# Patient Record
Sex: FEMALE | Race: WHITE
[De-identification: names, ages, dates, MRNs, and addresses within clinical notes are randomized per-mention and may not be internally consistent; named-entity substitution may affect disease eponyms.]

---

## 2017-02-28 ENCOUNTER — HOSPITAL ENCOUNTER (EMERGENCY)
Dept: HOSPITAL 80 - CED | Age: 32
Discharge: HOME | End: 2017-02-28
Payer: SELF-PAY

## 2017-02-28 VITALS
DIASTOLIC BLOOD PRESSURE: 64 MMHG | OXYGEN SATURATION: 94 % | HEART RATE: 63 BPM | TEMPERATURE: 98.4 F | SYSTOLIC BLOOD PRESSURE: 99 MMHG

## 2017-02-28 VITALS — RESPIRATION RATE: 20 BRPM

## 2017-02-28 DIAGNOSIS — J40: Primary | ICD-10-CM

## 2017-02-28 NOTE — UCPHY
H & P


Patient Type: New


Chief Complaint Nursing Narrative: cough productive getting worse


HPI/ROS: 





HPI





CHIEF COMPLAINT:  Cough





HISTORY OF PRESENT ILLNESS:  This patient very pleasant 31-year-old female, 

denies having any significant medical history does not take any daily 

medications, she presents to the urgent care with 3 days of worsening 

bronchitic sounding cough.  Faint wheezing.  Patient tells me that she had a 

cough intermittently for 6 months however over the past 3 days progressively 

worse.  It is worse when she exhales and gets spasm.  She denies fever, denies 

hemoptysis, denies productive cough, denies significant shortness of breath or 

chest pain or pleuritic pain.





Past Medical History:  Use take levothyroxine for thyroid disease but does not 

do so anymore





Past Surgical History: denies any significant surgical history





Social History: denies daily use tobacco drugs or alcohol





Family History:  Noncontributory








ROS   


REVIEW OF SYSTEMS:


A comprehensive 10 point review of systems is otherwise negative aside from 

elements mentioned in the history of present illness.








Exam   


Constitutional   triage nursing summary reviewed, vital signs reviewed, awake/

alert. 


Eyes   normal conjunctivae and sclera, EOMI, PERRLA. 


HENT   normal inspection, atraumatic, moist mucus membranes, no epistaxis, neck 

supple/ no meningismus, no raccoon eyes. 


Respiratory  bronchitic sounding cough especially during expiratory phase, no 

wheezing, clear to auscultation bilaterally, normal breath sounds, no 

respiratory distress, no wheezing. 


Cardiovascular   rate normal, regular rhythm, no murmur, no edema, distal 

pulses normal. 


Gastrointestinal   soft, non-tender, no rebound, no guarding, normal bowel 

sounds, no distension, no pulsatile mass. 


Genitourinary   no CVA tenderness. 


Musculoskeletal  no midline vertebral tenderness, full range of motion, no calf 

swelling, no tenderness of extremities, no meningismus, good pulses, 

neurovascularly intact.


Skin   pink, warm, & dry, no rash, skin atraumatic. 


Neurologic   awake, alert and oriented x 3, AAOx3, moves all 4 extremities 

equally, motor intact, sensory intact, CN II-XII intact, normal cerebellar, 

normal vision, normal speech. 


Psychiatric   normal mood/affect. 


Heme/Lymph/Immune   no lymphadenopathy.





Differential Diagnosis:  Includes but is not limited to in a particular order, 

upper respiratory tract infection, bronchitis, viral pneumonia, bacterial 

pneumonia, pneumothorax, pulmonary embolism, pertussis





Medical Decision Making: patient had a x-ray chest two view to evaluate for 

pneumonia pneumothorax, patient be given a DuoNeb breathing treatment here in 

the emergency room.  I feel that this patient benefit from albuterol inhaler, 

prednisone, guaifenesin and azithromycin.





Re-evaluation:








ED x-ray chest two view: negative for acute cardiopulmonary disease 

specifically no appreciable focal acute infiltrate.  Image interpreted by 

myself.


Source: Patient





- Personal History


LMP (Females 10-55): 22-28 Days Ago


Current Tetanus/Diphtheria Vaccine: Unsure





- Medical/Surgical History


Hx Asthma: No


Hx Chronic Respiratory Disease: No


Hx Diabetes: No


Hx Cardiac Disease: No


Hx Renal Disease: No


Hx Cirrhosis: No


Hx Alcoholism: No


Hx HIV/AIDS: No


Hx Splenectomy or Spleen Trauma: No


Other PMH: Normally runs low BP





- Family History


Significant Family History: No pertinent family hx





- Social History


Smoking Status: Never smoked


Constitutional: 


 Initial Vital Signs











Temperature (C)  36.7 C   02/28/17 19:45


 


Heart Rate  54 L  02/28/17 19:45


 


Respiratory Rate  20   02/28/17 19:45


 


Blood Pressure  114/63   02/28/17 19:45


 


O2 Sat (%)  99   02/28/17 19:45








 











O2 Delivery Mode               Room Air














Allergies/Adverse Reactions: 


 





No Known Allergies Allergy (Verified 02/28/17 19:44)


 








Home Medications: 














 Medication  Instructions  Recorded


 


AZITHROMYCIN [Z-PACK] 250 mg PO DAILY #6 tab 02/28/17


 


Albuterol [Proventil Inhaler HFA 1 - 2 puffs IH Q4H #1 mdi 02/28/17





(*)]  


 


Guaifenesin [Guaifenesin ER] 600 mg PO BID #14 tab.er.12h 02/28/17


 


predniSONE 60 mg PO DAILY #15 tab 02/28/17














Medical Decision Making





- Data Points


Medications Given: 


 








Discontinued Medications





Albuterol/Ipratropium (Duoneb)  3 ml IH EDNOW ONE


   Stop: 02/28/17 20:00


   Last Admin: 02/28/17 20:05 Dose:  3 ml








Departure





- Departure


Disposition: Home, Routine, Self-Care


Clinical Impression: 


 Bronchitis





Condition: Good


Instructions:  Acute Bronchitis (ED)


Additional Instructions: 


1. Drink lots of fluids stay well-hydrated


2. take her prescriptions as prescribed.


3.Return to the urgent care or emergency room if develops any worsening 

symptoms questions or concerns.


Referrals: 


NONE *PRIMARY CARE P,. [Primary Care Provider] - As per Instructions


Prescriptions: 


Albuterol [Proventil Inhaler HFA (*)] 1 - 2 puffs IH Q4H #1 mdi


AZITHROMYCIN [Z-PACK] 250 mg PO DAILY #6 tab


Guaifenesin [Guaifenesin ER] 600 mg PO BID #14 tab.er.12h


predniSONE 60 mg PO DAILY #15 tab





- PQRS


PQRS Measurement: 





n/a

## 2018-05-19 ENCOUNTER — HOSPITAL ENCOUNTER (EMERGENCY)
Dept: HOSPITAL 80 - FED | Age: 33
Discharge: HOME | End: 2018-05-19
Payer: SELF-PAY

## 2018-05-19 VITALS — DIASTOLIC BLOOD PRESSURE: 68 MMHG | SYSTOLIC BLOOD PRESSURE: 97 MMHG

## 2018-05-19 DIAGNOSIS — R10.31: Primary | ICD-10-CM

## 2018-05-19 LAB — PLATELET # BLD: 236 10^3/UL (ref 150–400)

## 2018-05-19 NOTE — EDPHY
H & P


Stated Complaint: RLQ Pain


Time Seen by Provider: 05/19/18 11:10


HPI/ROS: 





CHIEF COMPLAINT:  Right lower quadrant abdominal pain





HISTORY OF PRESENT ILLNESS:  32-year-old healthy female with no history of 

abdominal surgeries awoke this morning with sharp stabbing right lower quadrant 

abdominal pain.  No nausea no vomiting.  No back or flank pain.  No urinary 

abnormality.  Normal bowel movement.  No trauma.





She was scheduled to give post race massage is a triathlon, was at the massage 

mcdaniel however the pain became progressively worse and she therefore drove to 

the ER.  At the time I interview and examine her she is asymptomatic.





PRIMARY CARE PROVIDER:  





REVIEW OF SYSTEMS:


A ten point review of systems was performed and is negative with the exception 

of the items mentioned in the HPI








PAST MEDICAL & SURGICAL  HISTORY:  No history of abdominal surgery





SOCIAL HISTORY: Nonsmoker   














************


PHYSICAL EXAM





(Prior to examination, patient consented to physical exam, hands were washed 

and my usual and customary physical exam procedures followed)


1) GENERAL: Well-developed, well-nourished, alert and oriented.  Appears to be 

in no acute distress.  Smiling.


2) HEAD: Normocephalic, atraumatic


3) HEENT: Pupils equal, round, reactive to light bilaterally.  Sclera 

anicteric.  Nasopharynx, oropharynx, clear, no lesions. 


4) NECK: Full range of motion, no meningeal signs.


5) LUNGS: Clear auscultation bilaterally, no wheezes, no rhonchi, no 

retractions.   


6) HEART: Regular rate and rhythm, no murmur, no heave, no gallop.


7) ABDOMEN: No guarding, negative McBurney's point pain, negative Lockwood's, 

negative Rovsing's, negative peritoneal sign, I am unable to elicit any 

abdominal pain on exam,


8) MUSCULOSKELETAL: Moving all extremities, no focal areas of tenderness, no 

obvious trauma.  No peripheral edema or discoloration.


9) BACK: No CVA tenderness, no midline vertebral tenderness, no fluctuance, no 

step-off, no obvious trauma, no visual or palpable abnormality. 


10) SKIN: No rash, no petechiae. 


11) Psychiatric:  Patient is oriented X 3, there is no agitation.








***************





DIFFERENTIAL DIAGNOSIS:   My differential diagnosis includes, but is not 

limited to, acute appendicitis, acute cholecystitis, bowel obstruction, acute 

pancreatitis, ovarian torsion, ectopic pregnancy, gastritis and urinary tract 

infection.  The patient understands that this diagnosis is provisional and can 

never be 100% accurate.  This is a partial list of diagnoses considered. These 

considerations are based on history, physical exam, past history and 

reassessment.








- Personal History


LMP (Females 10-55): 8-14 Days Ago


Current Tetanus/Diphtheria Vaccine: No


Current Tetanus Diphtheria and Acellular Pertussis (TDAP): No





- Medical/Surgical History


Hx Asthma: No


Hx Chronic Respiratory Disease: No


Hx Diabetes: No


Hx Cardiac Disease: No


Hx Renal Disease: No


Hx Cirrhosis: No


Hx Alcoholism: No


Hx HIV/AIDS: No


Hx Splenectomy or Spleen Trauma: No


Other PMH: Normally runs low BP





- Social History


Smoking Status: Never smoked


Constitutional: 


 Initial Vital Signs











Temperature (C)  36.7 C   05/19/18 11:03


 


Heart Rate  47 L  05/19/18 11:03


 


Respiratory Rate  18   05/19/18 11:03


 


Blood Pressure  99/58 L  05/19/18 11:03


 


O2 Sat (%)  98   05/19/18 11:03








 











O2 Delivery Mode               Room Air














Allergies/Adverse Reactions: 


 





No Known Allergies Allergy (Verified 02/28/17 19:44)


 








Home Medications: 














 Medication  Instructions  Recorded


 


AZITHROMYCIN [Z-PACK] 250 mg PO DAILY #6 tab 02/28/17


 


Albuterol [Proventil Inhaler HFA 1 - 2 puffs IH Q4H #1 mdi 02/28/17





(*)]  


 


guaiFENesin [Guaifenesin ER] 600 mg PO BID #14 tab.er.12h 02/28/17


 


predniSONE 60 mg PO DAILY #15 tab 02/28/17














Medical Decision Making





- Diagnostics


Imaging Results: 


 Imaging Impressions





Abdomen Ultrasound  05/19/18 11:23


Impression:


1. Nonvisualization of the appendix.


 


Results called to Chip Fox PA-C, at 1:30 PM.








Pelvic/Renal Ultrasound  05/19/18 11:23


Impression: Negative pelvic sonogram. Small follicle cysts within the ovaries, 

with minimal free fluid left adnexa. 


 


Results called to Chip Fox PA-C, at 1:30 PM.











ED Course/Re-evaluation: 





11:32 a.m.:  Care of patient under supervision of  secondary supervising 

physician Dr Reis .  Will obtain diagnostic studies.





1:45 p.m.:  Re-evaluation.  At this time she is sitting upright, eating an 

energy bar, smiling, states that she is has no pain.  Re-examined her abdomen 

at this time which is soft no guarding or rebound no McBurney's point pain.  

Discussed her imaging results showing bilateral follicular cyst.  Appendix not 

visualized.  We discussed options.  At this time I think that acute 

appendicitis is less than likely.  I have discussed options including CT 

imaging now or recheck in 12 hr.  She prefers recheck in 12 hr which I think is 

a reasonable decision.  I believe her to have decision-making capacity.  

Definitely in the meantime she develops new or worsening symptoms to return to 

the ER sooner.  She feels comfortable being discharged.





- Data Points


Laboratory Results: 


 Laboratory Results





 05/19/18 11:15 





 05/19/18 11:15 





 











  05/19/18 05/19/18 05/19/18





  11:45 11:15 11:15


 


WBC      





    


 


RBC      





    


 


Hgb      





    


 


Hct      





    


 


MCV      





    


 


MCH      





    


 


MCHC      





    


 


RDW      





    


 


Plt Count      





    


 


MPV      





    


 


Neut % (Auto)      





    


 


Lymph % (Auto)      





    


 


Mono % (Auto)      





    


 


Eos % (Auto)      





    


 


Baso % (Auto)      





    


 


Nucleat RBC Rel Count      





    


 


Absolute Neuts (auto)      





    


 


Absolute Lymphs (auto)      





    


 


Absolute Monos (auto)      





    


 


Absolute Eos (auto)      





    


 


Absolute Basos (auto)      





    


 


Absolute Nucleated RBC      





    


 


Immature Gran %      





    


 


Immature Gran #      





    


 


Sodium      143 mEq/L mEq/L





     (135-145) 


 


Potassium      3.9 mEq/L mEq/L





     (3.3-5.0) 


 


Chloride      104 mEq/L mEq/L





     () 


 


Carbon Dioxide      26 mEq/l mEq/l





     (22-31) 


 


Anion Gap      13 mEq/L mEq/L





     (8-16) 


 


BUN      15 mg/dL mg/dL





     (7-23) 


 


Creatinine      0.8 mg/dL mg/dL





     (0.6-1.0) 


 


Estimated GFR      > 60 





    


 


Glucose      85 mg/dL mg/dL





     () 


 


Calcium      9.2 mg/dL mg/dL





     (8.5-10.4) 


 


Total Bilirubin      1.2 mg/dL mg/dL





     (0.1-1.4) 


 


Conjugated Bilirubin      0.4 mg/dL mg/dL





     (0.0-0.5) 


 


Unconjugated Bilirubin      0.8 mg/dL mg/dL





     (0.0-1.1) 


 


AST      47 IU/L H IU/L





     (14-46) 


 


ALT      16 IU/L IU/L





     (9-52) 


 


Alkaline Phosphatase      56 IU/L IU/L





     () 


 


Total Protein      7.3 g/dL g/dL





     (6.3-8.2) 


 


Albumin      4.4 g/dL g/dL





     (3.5-5.0) 


 


Lipase      53 IU/L IU/L





     () 


 


Beta HCG, Qual    NEGATIVE   





    


 


Urine Color  YELLOW     





    


 


Urine Appearance  CLEAR     





    


 


Urine pH  5.0     





   (5.0-7.5)   


 


Ur Specific Gravity  1.011     





   (1.002-1.030)   


 


Urine Protein  NEGATIVE     





   (NEGATIVE)   


 


Urine Ketones  NEGATIVE     





   (NEGATIVE)   


 


Urine Blood  NEGATIVE     





   (NEGATIVE)   


 


Urine Nitrate  NEGATIVE     





   (NEGATIVE)   


 


Urine Bilirubin  NEGATIVE     





   (NEGATIVE)   


 


Urine Urobilinogen  NEGATIVE EU EU    





   (0.2-1.0)   


 


Ur Leukocyte Esterase  NEGATIVE     





   (NEGATIVE)   


 


Urine RBC  NONE SEEN /hpf /hpf    





   (0-3)   


 


Urine WBC  1-3 /hpf /hpf    





   (0-3)   


 


Ur Epithelial Cells  TRACE /lpf /lpf    





   (NONE-1+)   


 


Urine Mucus  TRACE /lpf /lpf    





   (NONE-1+)   


 


Urine Glucose  NEGATIVE     





   (NEGATIVE)   














  05/19/18





  11:15


 


WBC  5.95 10^3/uL 10^3/uL





   (3.80-9.50) 


 


RBC  4.26 10^6/uL 10^6/uL





   (4.18-5.33) 


 


Hgb  13.7 g/dL g/dL





   (12.6-16.3) 


 


Hct  41.4 % %





   (38.0-47.0) 


 


MCV  97.2 fL fL





   (81.5-99.8) 


 


MCH  32.2 pg pg





   (27.9-34.1) 


 


MCHC  33.1 g/dL g/dL





   (32.4-36.7) 


 


RDW  12.3 % %





   (11.5-15.2) 


 


Plt Count  236 10^3/uL 10^3/uL





   (150-400) 


 


MPV  9.9 fL fL





   (8.7-11.7) 


 


Neut % (Auto)  66.4 % %





   (39.3-74.2) 


 


Lymph % (Auto)  25.7 % %





   (15.0-45.0) 


 


Mono % (Auto)  6.4 % %





   (4.5-13.0) 


 


Eos % (Auto)  0.5 % L %





   (0.6-7.6) 


 


Baso % (Auto)  0.8 % %





   (0.3-1.7) 


 


Nucleat RBC Rel Count  0.0 % %





   (0.0-0.2) 


 


Absolute Neuts (auto)  3.95 10^3/uL 10^3/uL





   (1.70-6.50) 


 


Absolute Lymphs (auto)  1.53 10^3/uL 10^3/uL





   (1.00-3.00) 


 


Absolute Monos (auto)  0.38 10^3/uL 10^3/uL





   (0.30-0.80) 


 


Absolute Eos (auto)  0.03 10^3/uL 10^3/uL





   (0.03-0.40) 


 


Absolute Basos (auto)  0.05 10^3/uL 10^3/uL





   (0.02-0.10) 


 


Absolute Nucleated RBC  0.00 10^3/uL 10^3/uL





   (0-0.01) 


 


Immature Gran %  0.2 % %





   (0.0-1.1) 


 


Immature Gran #  0.01 10^3/uL 10^3/uL





   (0.00-0.10) 


 


Sodium  





  


 


Potassium  





  


 


Chloride  





  


 


Carbon Dioxide  





  


 


Anion Gap  





  


 


BUN  





  


 


Creatinine  





  


 


Estimated GFR  





  


 


Glucose  





  


 


Calcium  





  


 


Total Bilirubin  





  


 


Conjugated Bilirubin  





  


 


Unconjugated Bilirubin  





  


 


AST  





  


 


ALT  





  


 


Alkaline Phosphatase  





  


 


Total Protein  





  


 


Albumin  





  


 


Lipase  





  


 


Beta HCG, Qual  





  


 


Urine Color  





  


 


Urine Appearance  





  


 


Urine pH  





  


 


Ur Specific Gravity  





  


 


Urine Protein  





  


 


Urine Ketones  





  


 


Urine Blood  





  


 


Urine Nitrate  





  


 


Urine Bilirubin  





  


 


Urine Urobilinogen  





  


 


Ur Leukocyte Esterase  





  


 


Urine RBC  





  


 


Urine WBC  





  


 


Ur Epithelial Cells  





  


 


Urine Mucus  





  


 


Urine Glucose  





  














Departure





- Departure


Disposition: Home, Routine, Self-Care


Clinical Impression: 


Abdominal pain


Qualifiers:


 Abdominal location: right lower quadrant Qualified Code(s): R10.31 - Right 

lower quadrant pain





Condition: Good


Instructions:  Acute Abdominal Pain (ED)


Additional Instructions: 


Seek immediate medical attention if you develop new or worsening symptoms, if 

you develop fevers, chills, inability to tolerate oral intake or any other 

symptoms that concerns you.


Referrals: 


Demetria Gómez MD [Medical Doctor] - 2-3 days, call for appt. (Dr Gómez is an ob

/gyn)


Return, to the ER in 12 hr for recheck [Other] - As per Instructions

## 2018-12-12 ENCOUNTER — HOSPITAL ENCOUNTER (EMERGENCY)
Dept: HOSPITAL 80 - FED | Age: 33
Discharge: HOME | End: 2018-12-12
Payer: SELF-PAY

## 2018-12-12 VITALS — DIASTOLIC BLOOD PRESSURE: 61 MMHG | SYSTOLIC BLOOD PRESSURE: 94 MMHG

## 2018-12-12 DIAGNOSIS — R07.9: Primary | ICD-10-CM

## 2018-12-12 NOTE — CPEKG
Test Reason : OPEN

Blood Pressure : ***/*** mmHG

Vent. Rate : 052 BPM     Atrial Rate : 053 BPM

   P-R Int : 177 ms          QRS Dur : 068 ms

    QT Int : 463 ms       P-R-T Axes : 070 049 055 degrees

   QTc Int : 431 ms

 

Sinus rhythm

 

Confirmed by Dickson Webster ([id number]) on 12/12/2018 3:50:41 PM

 

Referred By:             Confirmed By:Dickson Webster

## 2018-12-12 NOTE — EDPHY
H & P


Stated Complaint: L Cp "entire life" then last night Pain in L arm


Time Seen by Provider: 12/12/18 15:20


HPI/ROS: 





CHIEF COMPLAINT:  Episodic chest pain





HISTORY OF PRESENT ILLNESS:  The patient presents to the ED requesting 

evaluation of years of episodic chest pain.  She reports she experiences chest 

pain several times a day.  It is not precipitated by exertion.  She describes 

it as a sharp substernal discomfort.  She denies any asymmetric calf pain or 

swelling.  She denies pleuritic chest pain.  She does report that she had pain 

in her left arm earlier today.  The patient did have an echocardiogram in 2005 

for her symptoms which reportedly was unremarkable.  The patient is currently 

asymptomatic.





REVIEW OF SYSTEMS:


A comprehensive 10 point review of systems is otherwise negative aside from 

elements mentioned in the history of present illness.








Source: Patient


Exam Limitations: No limitations





- Personal History


LMP (Females 10-55): 22-28 Days Ago





- Medical/Surgical History


Hx Asthma: No


Hx Chronic Respiratory Disease: No


Hx Diabetes: No


Hx Cardiac Disease: No


Hx Renal Disease: No


Hx Cirrhosis: No


Hx Alcoholism: No


Hx HIV/AIDS: No


Hx Splenectomy or Spleen Trauma: No


Other PMH: echo 2005 normal "athletic heart".  normally runs low BP





- Social History


Smoking Status: Never smoked





- Physical Exam


Exam: 





General Appearance:  Alert, no distress


Eyes:  Pupils equal and round no pallor or injection


ENT, Mouth:  Mucous membranes moist


Respiratory:  There are no retractions, lungs are clear to auscultation


Cardiovascular:  Regular rate and rhythm


Gastrointestinal:  Abdomen is soft and nontender, no masses, bowel sounds normal


Neurological:  A&O, normal motor function, normal sensory exam, normal cranial 

nerves


Skin:  Warm and dry, no rashes


Musculoskeletal:  Neck is supple nontender


Extremities:  symmetrical, full range of motion








Constitutional: 


 Initial Vital Signs











Temperature (C)  36.7 C   12/12/18 14:12


 


Heart Rate  64   12/12/18 14:12


 


Respiratory Rate  16   12/12/18 14:12


 


Blood Pressure  100/72   12/12/18 14:12


 


O2 Sat (%)  97   12/12/18 14:12








 











O2 Delivery Mode               Room Air














Allergies/Adverse Reactions: 


 





No Known Allergies Allergy (Verified 02/28/17 19:44)


 








Home Medications: 














 Medication  Instructions  Recorded


 


NK [No Known Home Meds]  12/12/18














Medical Decision Making





- Diagnostics


EKG Interpretation: 





EKG:  Complete interpretation has been separately recorded in the [facility] 

archive.  Summary impression:  Sinus rhythm, rate 52


Imaging Results: 


Chest x-ray AP/lateral:  Images reviewed by myself.  Impression:  Negative for 

acute disease


ED Course/Re-evaluation: 





The patient presents the emergency department with atypical chest pain that is 

been present most of her adult life.  The patient's pain is nonexertional, 

nonpleuritic and she reportedly had a negative echocardiogram in her 20s for 

evaluation of the symptoms.





Workup in the emergency department today consisted of a normal EKG, 

unremarkable chest x-ray and negative troponin.





The patient is felt to be low risk for acute coronary syndrome.  HEART score 0.





Patient will be referred to outpatient Cardiology for further evaluation and 

management.





There is nothing to suggest pulmonary embolism or aortic dissection as a cause 

of her symptoms today.








Differential Diagnosis: 





Differential diagnosis considered includes acute coronary syndrome, pericarditis

, myocarditis, pneumothorax, esophageal spasm





- Data Points


Laboratory Results: 


 











  12/12/18





  15:42


 


POC Troponin I  0.00 ng/mL ng/mL





   (0.00-0.08) 











Point of Care Test Results: 


 Chemistry











  12/12/18





  15:42


 


POC Troponin I  0.00 ng/mL ng/mL





   (0.00-0.08) 














Departure





- Departure


Disposition: Home, Routine, Self-Care


Clinical Impression: 


 Chest pain





Condition: Good


Instructions:  Chest Pain (ED)


Additional Instructions: 


1. Based upon the testing done in the Emergency Department today we see no 

evidence of a heart attack.


2. We are unable to fully exclude coronary artery disease based upon the 

testing available in the Emergency Department.


3. For this reason, we would like you to be seen by cardiology for 

consideration of additional testing within the next 3 days.


4. Please contact the cardiologist you have been referred to schedule this 

appointment as soon as possible. Their offices are typically open from 8:30am-

5pm M-F. 


5. Please return to the Emergency Department immediately for any recurrent 

chest pain, difficulty breathing or other concerns.














Referrals: 


Christiana Stewart MD [Medical Doctor] - As per Instructions